# Patient Record
Sex: FEMALE | Race: WHITE
[De-identification: names, ages, dates, MRNs, and addresses within clinical notes are randomized per-mention and may not be internally consistent; named-entity substitution may affect disease eponyms.]

---

## 2020-08-27 ENCOUNTER — HOSPITAL ENCOUNTER (INPATIENT)
Dept: HOSPITAL 12 - ER | Age: 80
LOS: 2 days | Discharge: NURSING, REHAB OR LONG-TERM CARE | DRG: 391 | End: 2020-08-29
Payer: COMMERCIAL

## 2020-08-27 VITALS — WEIGHT: 167.31 LBS | HEIGHT: 65 IN | BODY MASS INDEX: 27.88 KG/M2

## 2020-08-27 DIAGNOSIS — G20: ICD-10-CM

## 2020-08-27 DIAGNOSIS — N18.9: ICD-10-CM

## 2020-08-27 DIAGNOSIS — Z96.643: ICD-10-CM

## 2020-08-27 DIAGNOSIS — K21.9: Primary | ICD-10-CM

## 2020-08-27 DIAGNOSIS — Z95.5: ICD-10-CM

## 2020-08-27 DIAGNOSIS — F32.9: ICD-10-CM

## 2020-08-27 DIAGNOSIS — I12.9: ICD-10-CM

## 2020-08-27 DIAGNOSIS — D63.1: ICD-10-CM

## 2020-08-27 DIAGNOSIS — K76.89: ICD-10-CM

## 2020-08-27 DIAGNOSIS — Z99.3: ICD-10-CM

## 2020-08-27 DIAGNOSIS — K57.30: ICD-10-CM

## 2020-08-27 DIAGNOSIS — N17.0: ICD-10-CM

## 2020-08-27 DIAGNOSIS — I25.10: ICD-10-CM

## 2020-08-27 DIAGNOSIS — Z79.82: ICD-10-CM

## 2020-08-27 DIAGNOSIS — N28.1: ICD-10-CM

## 2020-08-27 DIAGNOSIS — J98.11: ICD-10-CM

## 2020-08-27 LAB
BASOPHILS # BLD AUTO: 0 K/UL (ref 0–8)
BASOPHILS NFR BLD AUTO: 0.5 % (ref 0–2)
BUN SERPL-MCNC: 37 MG/DL (ref 7–18)
CHLORIDE SERPL-SCNC: 104 MMOL/L (ref 98–107)
CO2 SERPL-SCNC: 23 MMOL/L (ref 21–32)
CREAT SERPL-MCNC: 1.5 MG/DL (ref 0.6–1.3)
EOSINOPHIL # BLD AUTO: 0.3 K/UL (ref 0–0.7)
EOSINOPHIL NFR BLD AUTO: 4 % (ref 0–7)
GLUCOSE SERPL-MCNC: 112 MG/DL (ref 74–106)
HCT VFR BLD AUTO: 32.3 % (ref 31.2–41.9)
HGB BLD-MCNC: 10.7 G/DL (ref 10.9–14.3)
LYMPHOCYTES # BLD AUTO: 1.4 K/UL (ref 20–40)
LYMPHOCYTES NFR BLD AUTO: 19.8 % (ref 20.5–51.5)
MCH RBC QN AUTO: 29.7 UUG (ref 24.7–32.8)
MCHC RBC AUTO-ENTMCNC: 33 G/DL (ref 32.3–35.6)
MCV RBC AUTO: 89.5 FL (ref 75.5–95.3)
MONOCYTES # BLD AUTO: 0.8 K/UL (ref 2–10)
MONOCYTES NFR BLD AUTO: 11.2 % (ref 0–11)
NEUTROPHILS # BLD AUTO: 4.4 K/UL (ref 1.8–8.9)
NEUTROPHILS NFR BLD AUTO: 64.5 % (ref 38.5–71.5)
PLATELET # BLD AUTO: 207 K/UL (ref 179–408)
POTASSIUM SERPL-SCNC: 5 MMOL/L (ref 3.5–5.1)
RBC # BLD AUTO: 3.61 MIL/UL (ref 3.63–4.92)
WBC # BLD AUTO: 6.9 K/UL (ref 3.8–11.8)

## 2020-08-27 PROCEDURE — G0378 HOSPITAL OBSERVATION PER HR: HCPCS

## 2020-08-27 PROCEDURE — A4663 DIALYSIS BLOOD PRESSURE CUFF: HCPCS

## 2020-08-27 NOTE — NUR
Patient arrived from the ED via gurney. No s/s of distress noted at this time. Patient 
denies SOB and chest pain. Cardiac monitor is on. Bed is low and locked. Safety measures 
initiated and will continue to monitor.

## 2020-08-27 NOTE — NUR
BIB [facility] 81 C/O CHEST PAIN.  12 LEAD EKG DONE.  PLACED IS ON A MONITOR.  PATIENT 
IS AWAKE AND ALERT.   IV ALREADY IN PLACE.

## 2020-08-28 VITALS — DIASTOLIC BLOOD PRESSURE: 83 MMHG | SYSTOLIC BLOOD PRESSURE: 192 MMHG

## 2020-08-28 VITALS — DIASTOLIC BLOOD PRESSURE: 76 MMHG | SYSTOLIC BLOOD PRESSURE: 176 MMHG

## 2020-08-28 VITALS — SYSTOLIC BLOOD PRESSURE: 179 MMHG | DIASTOLIC BLOOD PRESSURE: 90 MMHG

## 2020-08-28 VITALS — SYSTOLIC BLOOD PRESSURE: 160 MMHG | DIASTOLIC BLOOD PRESSURE: 69 MMHG

## 2020-08-28 VITALS — SYSTOLIC BLOOD PRESSURE: 155 MMHG | DIASTOLIC BLOOD PRESSURE: 60 MMHG

## 2020-08-28 VITALS — SYSTOLIC BLOOD PRESSURE: 185 MMHG | DIASTOLIC BLOOD PRESSURE: 70 MMHG

## 2020-08-28 LAB
ALP SERPL-CCNC: 68 U/L (ref 50–136)
ALT SERPL W/O P-5'-P-CCNC: 14 U/L (ref 14–59)
AST SERPL-CCNC: 21 U/L (ref 15–37)
BASOPHILS # BLD AUTO: 0 K/UL (ref 0–8)
BASOPHILS NFR BLD AUTO: 0.5 % (ref 0–2)
BILIRUB SERPL-MCNC: 0.2 MG/DL (ref 0.2–1)
BUN SERPL-MCNC: 36 MG/DL (ref 7–18)
CHLORIDE SERPL-SCNC: 105 MMOL/L (ref 98–107)
CHOLEST SERPL-MCNC: 154 MG/DL (ref ?–200)
CO2 SERPL-SCNC: 27 MMOL/L (ref 21–32)
CREAT SERPL-MCNC: 1.6 MG/DL (ref 0.6–1.3)
EOSINOPHIL # BLD AUTO: 0.3 K/UL (ref 0–0.7)
EOSINOPHIL NFR BLD AUTO: 4 % (ref 0–7)
GLUCOSE SERPL-MCNC: 99 MG/DL (ref 74–106)
HCT VFR BLD AUTO: 34.1 % (ref 31.2–41.9)
HDLC SERPL-MCNC: 57 MG/DL (ref 40–60)
HGB BLD-MCNC: 11.6 G/DL (ref 10.9–14.3)
LYMPHOCYTES # BLD AUTO: 1.3 K/UL (ref 20–40)
LYMPHOCYTES NFR BLD AUTO: 16.8 % (ref 20.5–51.5)
MAGNESIUM SERPL-MCNC: 2.2 MG/DL (ref 1.8–2.4)
MCH RBC QN AUTO: 30.1 UUG (ref 24.7–32.8)
MCHC RBC AUTO-ENTMCNC: 34 G/DL (ref 32.3–35.6)
MCV RBC AUTO: 88.4 FL (ref 75.5–95.3)
MONOCYTES # BLD AUTO: 0.8 K/UL (ref 2–10)
MONOCYTES NFR BLD AUTO: 9.7 % (ref 0–11)
NEUTROPHILS # BLD AUTO: 5.4 K/UL (ref 1.8–8.9)
NEUTROPHILS NFR BLD AUTO: 69 % (ref 38.5–71.5)
PHOSPHATE SERPL-MCNC: 4.7 MG/DL (ref 2.5–4.9)
PLATELET # BLD AUTO: 218 K/UL (ref 179–408)
POTASSIUM SERPL-SCNC: 4.2 MMOL/L (ref 3.5–5.1)
RBC # BLD AUTO: 3.85 MIL/UL (ref 3.63–4.92)
TRIGL SERPL-MCNC: 142 MG/DL (ref 30–150)
WBC # BLD AUTO: 7.8 K/UL (ref 3.8–11.8)
WS STN SPEC: 7.3 G/DL (ref 6.4–8.2)

## 2020-08-28 RX ADMIN — ESCITALOPRAM OXALATE SCH MG: 10 TABLET, FILM COATED ORAL at 16:20

## 2020-08-28 RX ADMIN — CLONIDINE HYDROCHLORIDE PRN MG: 0.1 TABLET ORAL at 21:39

## 2020-08-28 RX ADMIN — PANTOPRAZOLE SODIUM SCH MG: 40 TABLET, DELAYED RELEASE ORAL at 06:07

## 2020-08-28 RX ADMIN — CARBIDOPA AND LEVODOPA SCH TAB: 25; 100 TABLET ORAL at 16:19

## 2020-08-28 RX ADMIN — Medication SCH MG: at 08:35

## 2020-08-28 RX ADMIN — CARBIDOPA AND LEVODOPA SCH TAB: 25; 100 TABLET ORAL at 12:08

## 2020-08-28 RX ADMIN — CLONIDINE HYDROCHLORIDE PRN MG: 0.1 TABLET ORAL at 15:50

## 2020-08-28 RX ADMIN — AMLODIPINE BESYLATE SCH MG: 10 TABLET ORAL at 08:35

## 2020-08-28 RX ADMIN — Medication SCH MG: at 20:37

## 2020-08-28 NOTE — NUR
nd let them know about patient's BP and refused to take patient's

back if still BP elevated. Clonidine 0.1 given. Discharge cancelled. 

Possible d/c in am. latest /72 HR 66. Patient sleeping.Will

monitor patient. 

-------------------------------------------------------------------------------

Addendum: 08/29/20 at 0640 by SHAWNA WOLFF RN

-------------------------------------------------------------------------------

nursing notes not completed. Will redo another nursing notes.

## 2020-08-28 NOTE — NUR
received pt. resting in bed alert oriented x3 on room air saturating well. IV in L Forearm 
20 gauge intact patent saline lock. safety measures in place. call light within reach. will 
continue to monitor pt.

## 2020-08-28 NOTE — NUR
pt. will be discharged back to [facility]. IV removed. ID band removed. all discharge 
papers printed and given to PM nurse. belongings completed. all belongings with pt. gave 
report to RN at [facility] named judith who is expecting patient.

## 2020-08-29 VITALS — SYSTOLIC BLOOD PRESSURE: 196 MMHG | DIASTOLIC BLOOD PRESSURE: 80 MMHG

## 2020-08-29 VITALS — SYSTOLIC BLOOD PRESSURE: 117 MMHG | DIASTOLIC BLOOD PRESSURE: 50 MMHG

## 2020-08-29 VITALS — SYSTOLIC BLOOD PRESSURE: 145 MMHG | DIASTOLIC BLOOD PRESSURE: 60 MMHG

## 2020-08-29 VITALS — SYSTOLIC BLOOD PRESSURE: 115 MMHG | DIASTOLIC BLOOD PRESSURE: 57 MMHG

## 2020-08-29 VITALS — SYSTOLIC BLOOD PRESSURE: 124 MMHG | DIASTOLIC BLOOD PRESSURE: 59 MMHG

## 2020-08-29 RX ADMIN — AMLODIPINE BESYLATE SCH MG: 10 TABLET ORAL at 08:48

## 2020-08-29 RX ADMIN — ESCITALOPRAM OXALATE SCH MG: 10 TABLET, FILM COATED ORAL at 08:39

## 2020-08-29 RX ADMIN — PANTOPRAZOLE SODIUM SCH MG: 40 TABLET, DELAYED RELEASE ORAL at 06:21

## 2020-08-29 RX ADMIN — CLONIDINE HYDROCHLORIDE PRN MG: 0.1 TABLET ORAL at 05:26

## 2020-08-29 RX ADMIN — Medication SCH MG: at 08:48

## 2020-08-29 NOTE — NUR
received pt. resting in bed alert oriented x3 on room air saturating well. pt. denies pain/ 
discomfort. pt. denies SOB/ difficulty breathing. IV in L Forearm 20 gauge intact patent 
saline lock. safety measures in place. call light within reach. will continue to monitor pt.

## 2020-08-29 NOTE — NUR
pt blood pressure stable BP at 145/60 HR 83 before giving AM BP medications. Reported to Dr. Sawyer who stated it is okay to continue discharge.  Shalini aware and okayed to 
give report to [facility]. Spoke to charge nurse Iveth for report at [facility]. Awaiting 
transportation through pt.'s insurance according to . pt. is stable and awaiting 
discharge.

## 2020-08-29 NOTE — NUR
End of shift notes: Slept well most of the shift. Incontinent of urine x2.

Kept clean and dry. No acute distress noted. Latest /70 HR 57.

Will monitor patient. No complaints presented. Will monitor patient's BP.

All needs attended and met.

## 2020-08-29 NOTE — NUR
Awake alert and oriented x 2-3 . forgetful at times. Patient to be discharge to [facility] 

Awaiting for ambulance. /91 HR75 resp18 pulse ox 96%  Afebrile. All due meds given

as scheduled. All BP meds given. Will monitor patient's BP. Ambulance came and about to

pick her up but BP still high. BP rechecked after an hour /83 HR 70. [facility]

notified of patient's BP. Refused to take patient if BP still high. Clonidine 0.1 mg given.

latest BP taken @ 2200 was 147/72. HR 66. Patient asymptomatic.Patient slept all night.

4am /80 HR 57 Clonidine 0.1 mg given @ 0530 Will monitor patient.

## 2020-08-29 NOTE — NUR
pt. getting discharged back to [facility] stable at 115/57. ambulance here to  
pt. IV removed. Id band removed. All belongings with pt.